# Patient Record
Sex: FEMALE | Race: WHITE | HISPANIC OR LATINO
[De-identification: names, ages, dates, MRNs, and addresses within clinical notes are randomized per-mention and may not be internally consistent; named-entity substitution may affect disease eponyms.]

---

## 2018-08-30 ENCOUNTER — RESULT REVIEW (OUTPATIENT)
Age: 33
End: 2018-08-30

## 2020-08-19 ENCOUNTER — TRANSCRIPTION ENCOUNTER (OUTPATIENT)
Age: 35
End: 2020-08-19

## 2020-11-18 ENCOUNTER — RESULT REVIEW (OUTPATIENT)
Age: 35
End: 2020-11-18

## 2021-01-27 ENCOUNTER — RESULT REVIEW (OUTPATIENT)
Age: 36
End: 2021-01-27

## 2021-04-21 ENCOUNTER — RESULT REVIEW (OUTPATIENT)
Age: 36
End: 2021-04-21

## 2021-12-01 ENCOUNTER — TRANSCRIPTION ENCOUNTER (OUTPATIENT)
Age: 36
End: 2021-12-01

## 2022-04-05 ENCOUNTER — APPOINTMENT (OUTPATIENT)
Dept: OTOLARYNGOLOGY | Facility: CLINIC | Age: 37
End: 2022-04-05
Payer: COMMERCIAL

## 2022-04-05 VITALS
BODY MASS INDEX: 23 KG/M2 | DIASTOLIC BLOOD PRESSURE: 68 MMHG | SYSTOLIC BLOOD PRESSURE: 113 MMHG | WEIGHT: 125 LBS | HEIGHT: 62 IN | HEART RATE: 69 BPM

## 2022-04-05 PROCEDURE — 99243 OFF/OP CNSLTJ NEW/EST LOW 30: CPT

## 2022-04-05 NOTE — PHYSICAL EXAM
[FreeTextEntry1] : there is a palp smooth mobile sl tender cystic lesion palp R inf cheek externally and submucosal intraorally.  [Normal] : no rashes

## 2022-04-05 NOTE — CONSULT LETTER
[Dear  ___] : Dear  [unfilled], [Consult Letter:] : I had the pleasure of evaluating your patient, [unfilled]. [Please see my note below.] : Please see my note below. [Sincerely,] : Sincerely, [FreeTextEntry2] : Abhay Elam MD ( [place], [place]) [FreeTextEntry3] : Yobany Grande MD, FACS\par \par    [facility] [place]\par Associate Chair\par    Department of Otolaryngology\par \par Professor\par Otolaryngology & Molecular Medicine\par [facility] [employer]\par

## 2022-04-05 NOTE — HISTORY OF PRESENT ILLNESS
[None] : No associated symptoms are reported. [de-identified] : 38 yo female who presents for an evaluation on right cheek lump. SHe is being referred by Dr. ОЛЬГА Elam. She states noticing a lump on her right cheek in Jan. 2022 she was treated with antibiotics by her PCP, no change, she then saw dermatology and then ENT. She feels as the lump has gotten larger, uncomfortable. Denies any trauma to the area.\par 2/4/2022 ultrasound soft tissue neck showing palpable lump in the right mandibular area measuring 1.1cm\par 3/7/2022 FNA right mandible nodule negative for malignant cells.\par 2/17/2021 ultrasound showing few bilateral thyroid nodules. right upper pole cystic nodule is slighly larger, the left lower lobe complex nodule is slightly smaller and a 5 mm right midpole cyst nodule is newly noted.  Denies family hisotry of head and neck cancer. \par Denies history of smoking. Occasional alcohol intake. \par Booster Covid 19 vacc 11/2021\par \par Pt feels lesion intraorally w her tongue

## 2022-04-08 ENCOUNTER — TRANSCRIPTION ENCOUNTER (OUTPATIENT)
Age: 37
End: 2022-04-08

## 2022-04-20 ENCOUNTER — APPOINTMENT (OUTPATIENT)
Dept: MRI IMAGING | Facility: CLINIC | Age: 37
End: 2022-04-20

## 2022-04-20 ENCOUNTER — OUTPATIENT (OUTPATIENT)
Dept: OUTPATIENT SERVICES | Facility: HOSPITAL | Age: 37
LOS: 1 days | End: 2022-04-20
Payer: COMMERCIAL

## 2022-04-20 DIAGNOSIS — K09.9 CYST OF ORAL REGION, UNSPECIFIED: ICD-10-CM

## 2022-04-20 DIAGNOSIS — N60.01 SOLITARY CYST OF RIGHT BREAST: Chronic | ICD-10-CM

## 2022-04-20 DIAGNOSIS — Z98.89 OTHER SPECIFIED POSTPROCEDURAL STATES: Chronic | ICD-10-CM

## 2022-04-20 PROCEDURE — A9585: CPT

## 2022-04-20 PROCEDURE — 70543 MRI ORBT/FAC/NCK W/O &W/DYE: CPT

## 2022-04-20 PROCEDURE — 70543 MRI ORBT/FAC/NCK W/O &W/DYE: CPT | Mod: 26

## 2022-05-13 DIAGNOSIS — Z01.818 ENCOUNTER FOR OTHER PREPROCEDURAL EXAMINATION: ICD-10-CM

## 2022-05-20 ENCOUNTER — OUTPATIENT (OUTPATIENT)
Dept: OUTPATIENT SERVICES | Facility: HOSPITAL | Age: 37
LOS: 1 days | End: 2022-05-20

## 2022-05-20 VITALS
DIASTOLIC BLOOD PRESSURE: 74 MMHG | SYSTOLIC BLOOD PRESSURE: 114 MMHG | HEART RATE: 57 BPM | RESPIRATION RATE: 16 BRPM | OXYGEN SATURATION: 98 % | TEMPERATURE: 97 F | HEIGHT: 61.5 IN | WEIGHT: 125 LBS

## 2022-05-20 DIAGNOSIS — K09.8 OTHER CYSTS OF ORAL REGION, NOT ELSEWHERE CLASSIFIED: ICD-10-CM

## 2022-05-20 DIAGNOSIS — K09.9 CYST OF ORAL REGION, UNSPECIFIED: ICD-10-CM

## 2022-05-20 DIAGNOSIS — N60.01 SOLITARY CYST OF RIGHT BREAST: Chronic | ICD-10-CM

## 2022-05-20 DIAGNOSIS — Z98.890 OTHER SPECIFIED POSTPROCEDURAL STATES: Chronic | ICD-10-CM

## 2022-05-20 DIAGNOSIS — Z98.89 OTHER SPECIFIED POSTPROCEDURAL STATES: Chronic | ICD-10-CM

## 2022-05-20 LAB
HCG UR QL: NEGATIVE — SIGNIFICANT CHANGE UP
HCT VFR BLD CALC: 40.8 % — SIGNIFICANT CHANGE UP (ref 34.5–45)
HGB BLD-MCNC: 13.1 G/DL — SIGNIFICANT CHANGE UP (ref 11.5–15.5)
MCHC RBC-ENTMCNC: 29.4 PG — SIGNIFICANT CHANGE UP (ref 27–34)
MCHC RBC-ENTMCNC: 32.1 GM/DL — SIGNIFICANT CHANGE UP (ref 32–36)
MCV RBC AUTO: 91.7 FL — SIGNIFICANT CHANGE UP (ref 80–100)
NRBC # BLD: 0 /100 WBCS — SIGNIFICANT CHANGE UP
NRBC # FLD: 0 K/UL — SIGNIFICANT CHANGE UP
PLATELET # BLD AUTO: 309 K/UL — SIGNIFICANT CHANGE UP (ref 150–400)
RBC # BLD: 4.45 M/UL — SIGNIFICANT CHANGE UP (ref 3.8–5.2)
RBC # FLD: 14.6 % — HIGH (ref 10.3–14.5)
WBC # BLD: 7.35 K/UL — SIGNIFICANT CHANGE UP (ref 3.8–10.5)
WBC # FLD AUTO: 7.35 K/UL — SIGNIFICANT CHANGE UP (ref 3.8–10.5)

## 2022-05-20 RX ORDER — SODIUM CHLORIDE 9 MG/ML
1000 INJECTION, SOLUTION INTRAVENOUS
Refills: 0 | Status: DISCONTINUED | OUTPATIENT
Start: 2022-06-03 | End: 2022-06-18

## 2022-05-20 NOTE — H&P PST ADULT - ENMT COMMENTS
hx of cyst to right cheek which she first noticed jan 2022.  Pt reports cyst was drained, then returns. Scheduled for right excision of oral lesion right cheek cyst per dx

## 2022-05-20 NOTE — H&P PST ADULT - ASSESSMENT
36 y/o female with hx of cyst to right cheek which she first noticed jan 2022.  Pt reports cyst was drained, then returns. Scheduled for right excision of oral lesion

## 2022-05-20 NOTE — H&P PST ADULT - NSICDXPASTSURGICALHX_GEN_ALL_CORE_FT
PAST SURGICAL HISTORY:  Breast cyst, right cyst removal - benign    S/P bunionectomy 2010 (R) 5th toe screw    S/P LASIK surgery 2006

## 2022-05-20 NOTE — H&P PST ADULT - NSICDXPASTMEDICALHX_GEN_ALL_CORE_FT
PAST MEDICAL HISTORY:  Endometriosis     Fibrocystic breast      PAST MEDICAL HISTORY:  Cyst of mouth     Endometriosis     Fibrocystic breast

## 2022-05-20 NOTE — H&P PST ADULT - HISTORY OF PRESENT ILLNESS
38 y/o female with hx of cyst to right cheek which she first noticed jan 2022.  Pt reports cyst was drained, then returns. Scheduled for right excision of oral lesion

## 2022-06-01 LAB — SARS-COV-2 N GENE NPH QL NAA+PROBE: NOT DETECTED

## 2022-06-02 ENCOUNTER — TRANSCRIPTION ENCOUNTER (OUTPATIENT)
Age: 37
End: 2022-06-02

## 2022-06-02 NOTE — ASU PATIENT PROFILE, ADULT - FALL HARM RISK - UNIVERSAL INTERVENTIONS
Bed in lowest position, wheels locked, appropriate side rails in place/Call bell, personal items and telephone in reach/Instruct patient to call for assistance before getting out of bed or chair/Non-slip footwear when patient is out of bed/[place] to call system/Physically safe environment - no spills, clutter or unnecessary equipment/Purposeful Proactive Rounding/Room/bathroom lighting operational, light cord in reach

## 2022-06-02 NOTE — ASU PATIENT PROFILE, ADULT - IS PATIENT PREGNANT?
no
No. DINESH screening performed.  STOP BANG Legend: 0-2 = LOW Risk; 3-4 = INTERMEDIATE Risk; 5-8 = HIGH Risk

## 2022-06-03 ENCOUNTER — OUTPATIENT (OUTPATIENT)
Dept: OUTPATIENT SERVICES | Facility: HOSPITAL | Age: 37
LOS: 1 days | Discharge: HOME | End: 2022-06-03
Payer: COMMERCIAL

## 2022-06-03 ENCOUNTER — TRANSCRIPTION ENCOUNTER (OUTPATIENT)
Age: 37
End: 2022-06-03

## 2022-06-03 ENCOUNTER — APPOINTMENT (OUTPATIENT)
Dept: OTOLARYNGOLOGY | Facility: HOSPITAL | Age: 37
End: 2022-06-03

## 2022-06-03 VITALS
RESPIRATION RATE: 16 BRPM | HEART RATE: 66 BPM | DIASTOLIC BLOOD PRESSURE: 59 MMHG | OXYGEN SATURATION: 100 % | SYSTOLIC BLOOD PRESSURE: 107 MMHG

## 2022-06-03 VITALS
OXYGEN SATURATION: 100 % | TEMPERATURE: 99 F | RESPIRATION RATE: 16 BRPM | DIASTOLIC BLOOD PRESSURE: 78 MMHG | HEART RATE: 65 BPM | WEIGHT: 125 LBS | HEIGHT: 61.5 IN | SYSTOLIC BLOOD PRESSURE: 107 MMHG

## 2022-06-03 DIAGNOSIS — Z98.890 OTHER SPECIFIED POSTPROCEDURAL STATES: Chronic | ICD-10-CM

## 2022-06-03 DIAGNOSIS — Z98.89 OTHER SPECIFIED POSTPROCEDURAL STATES: Chronic | ICD-10-CM

## 2022-06-03 DIAGNOSIS — K09.9 CYST OF ORAL REGION, UNSPECIFIED: ICD-10-CM

## 2022-06-03 DIAGNOSIS — N60.01 SOLITARY CYST OF RIGHT BREAST: Chronic | ICD-10-CM

## 2022-06-03 LAB — HCG UR QL: NEGATIVE — SIGNIFICANT CHANGE UP

## 2022-06-03 PROCEDURE — 92502 EAR AND THROAT EXAMINATION: CPT | Mod: GC

## 2022-06-03 NOTE — ASU DISCHARGE PLAN (ADULT/PEDIATRIC) - CARE PROVIDER_API CALL
Yobany Grande)  [facility]; Otolaryngology  [address] [place] [id number]  Phone: [phone]  Fax: [phone]  Follow Up Time:

## 2022-06-21 PROBLEM — N80.9 ENDOMETRIOSIS, UNSPECIFIED: Chronic | Status: ACTIVE | Noted: 2022-05-20

## 2022-07-19 ENCOUNTER — APPOINTMENT (OUTPATIENT)
Dept: OTOLARYNGOLOGY | Facility: CLINIC | Age: 37
End: 2022-07-19

## 2022-08-02 ENCOUNTER — APPOINTMENT (OUTPATIENT)
Dept: OTOLARYNGOLOGY | Facility: CLINIC | Age: 37
End: 2022-08-02

## 2022-08-02 VITALS
SYSTOLIC BLOOD PRESSURE: 118 MMHG | HEIGHT: 62 IN | DIASTOLIC BLOOD PRESSURE: 72 MMHG | HEART RATE: 82 BPM | BODY MASS INDEX: 21.71 KG/M2 | WEIGHT: 118 LBS

## 2022-08-02 DIAGNOSIS — K09.9 CYST OF ORAL REGION, UNSPECIFIED: ICD-10-CM

## 2022-08-02 PROCEDURE — 99213 OFFICE O/P EST LOW 20 MIN: CPT

## 2022-08-02 NOTE — CONSULT LETTER
[Dear  ___] : Dear  [unfilled], [Courtesy Letter:] : I had the pleasure of seeing your patient, [unfilled], in my office today. [Please see my note below.] : Please see my note below. [Sincerely,] : Sincerely, [FreeTextEntry2] : Abhay Elam MD ( [place], [place])  [FreeTextEntry3] : Yobany Grande MD, FACS\par \par [facility] [place]\par Associate Chair\par Department of Otolaryngology\par Professor\par Otolaryngology & Molecular Medicine\par [facility] [employer]\par

## 2022-08-02 NOTE — REASON FOR VISIT
[Post-Operative Visit] : a post-operative visit [FreeTextEntry2] : Otolaryngology evaluation under anesthesia on 6/3/2022

## 2022-08-02 NOTE — HISTORY OF PRESENT ILLNESS
[de-identified] : Ms. Oreilly presetns for post op follow up. She is s/p otolaryngology evaluation under anesthesia on 6/3/2022 for right buccal region cyst.\par States still having discomfort in the area of the cyst.  States feels cyst has gotten larger than prior to the attempted surgery.  Denies fluid coming from cyst.  \par Denies fevers, weight loss.\par

## 2022-08-05 ENCOUNTER — NON-APPOINTMENT (OUTPATIENT)
Age: 37
End: 2022-08-05

## 2022-08-06 ENCOUNTER — NON-APPOINTMENT (OUTPATIENT)
Age: 37
End: 2022-08-06

## 2022-08-26 ENCOUNTER — OUTPATIENT (OUTPATIENT)
Dept: OUTPATIENT SERVICES | Facility: HOSPITAL | Age: 37
LOS: 1 days | End: 2022-08-26

## 2022-08-26 VITALS
RESPIRATION RATE: 16 BRPM | SYSTOLIC BLOOD PRESSURE: 122 MMHG | TEMPERATURE: 97 F | HEART RATE: 73 BPM | OXYGEN SATURATION: 98 % | WEIGHT: 113.98 LBS | HEIGHT: 61 IN | DIASTOLIC BLOOD PRESSURE: 78 MMHG

## 2022-08-26 DIAGNOSIS — D49.2 NEOPLASM OF UNSPECIFIED BEHAVIOR OF BONE, SOFT TISSUE, AND SKIN: ICD-10-CM

## 2022-08-26 DIAGNOSIS — R22.0 LOCALIZED SWELLING, MASS AND LUMP, HEAD: ICD-10-CM

## 2022-08-26 DIAGNOSIS — Z98.89 OTHER SPECIFIED POSTPROCEDURAL STATES: Chronic | ICD-10-CM

## 2022-08-26 DIAGNOSIS — N60.01 SOLITARY CYST OF RIGHT BREAST: Chronic | ICD-10-CM

## 2022-08-26 DIAGNOSIS — Z87.42 PERSONAL HISTORY OF OTHER DISEASES OF THE FEMALE GENITAL TRACT: Chronic | ICD-10-CM

## 2022-08-26 DIAGNOSIS — Z98.890 OTHER SPECIFIED POSTPROCEDURAL STATES: Chronic | ICD-10-CM

## 2022-08-26 LAB
HCG UR QL: NEGATIVE — SIGNIFICANT CHANGE UP
HCT VFR BLD CALC: 39.3 % — SIGNIFICANT CHANGE UP (ref 34.5–45)
HGB BLD-MCNC: 12.7 G/DL — SIGNIFICANT CHANGE UP (ref 11.5–15.5)
MCHC RBC-ENTMCNC: 28.7 PG — SIGNIFICANT CHANGE UP (ref 27–34)
MCHC RBC-ENTMCNC: 32.3 GM/DL — SIGNIFICANT CHANGE UP (ref 32–36)
MCV RBC AUTO: 88.9 FL — SIGNIFICANT CHANGE UP (ref 80–100)
NRBC # BLD: 0 /100 WBCS — SIGNIFICANT CHANGE UP (ref 0–0)
NRBC # FLD: 0 K/UL — SIGNIFICANT CHANGE UP (ref 0–0)
PLATELET # BLD AUTO: 315 K/UL — SIGNIFICANT CHANGE UP (ref 150–400)
RBC # BLD: 4.42 M/UL — SIGNIFICANT CHANGE UP (ref 3.8–5.2)
RBC # FLD: 12.9 % — SIGNIFICANT CHANGE UP (ref 10.3–14.5)
WBC # BLD: 10.21 K/UL — SIGNIFICANT CHANGE UP (ref 3.8–10.5)
WBC # FLD AUTO: 10.21 K/UL — SIGNIFICANT CHANGE UP (ref 3.8–10.5)

## 2022-08-26 RX ORDER — SODIUM CHLORIDE 9 MG/ML
1000 INJECTION, SOLUTION INTRAVENOUS
Refills: 0 | Status: DISCONTINUED | OUTPATIENT
Start: 2022-09-08 | End: 2022-09-22

## 2022-08-26 RX ORDER — L.ACIDOPH/B.ANIMALIS/B.LONGUM 15B CELL
1 CAPSULE ORAL
Qty: 0 | Refills: 0 | DISCHARGE

## 2022-08-26 RX ORDER — MULTIVIT-MIN/FERROUS GLUCONATE 9 MG/15 ML
0 LIQUID (ML) ORAL
Qty: 0 | Refills: 0 | DISCHARGE

## 2022-08-26 NOTE — H&P PST ADULT - HISTORY OF PRESENT ILLNESS
38 y/o female with hx of cyst to right cheek which she first noticed Jan 2022.  Pt reports cyst was drained, then returns. Scheduled for Excision benign tumor or cyst right mandible.  38 y/o female with hx of cyst to right cheek which she first noticed Jan 2022.  Pt reports cyst was drained, then returns.  Pt went to OR [facility] May 2022 for excisional biopsy which was aborted after spontaneous decompression during the procedure. The cyst reoccurred after surgery.  Now scheduled for  Excision benign tumor or cyst right mandible.     Per surgeon's note dated 8/7/22  "submucosal mass right vestibule not attached to mucosa, nor mandible"

## 2022-08-26 NOTE — H&P PST ADULT - ASSESSMENT
36 y/o female with hx of cyst to right cheek which she first noticed Jan 2022.  Pt reports cyst was drained, then returns.  Pt went to OR [facility] May 2022 for excisional biopsy which was aborted after spontaneous decompression during the procedure. The cyst reoccurred after surgery.  Now scheduled for  Excision benign tumor or cyst right mandible.     Per surgeon's note dated 8/7/22  "submucosal mass right vestibule not attached to mucosa, nor mandible"

## 2022-08-26 NOTE — H&P PST ADULT - PROBLEM SELECTOR PLAN 1
Excision benign tumor or cyst right mandible.     Per surgeon's note dated 8/7/22  "submucosal mass right vestibule not attached to mucosa, nor mandible"    CBC UCG    preop instructions given and explained

## 2022-08-26 NOTE — H&P PST ADULT - NSICDXPASTSURGICALHX_GEN_ALL_CORE_FT
PAST SURGICAL HISTORY:  Breast cyst, right cyst removal - benign    History of endometriosis laparosocpy 2017    S/P bunionectomy 2010 (R) 5th toe screw    S/P LASIK surgery 2006

## 2022-08-26 NOTE — H&P PST ADULT - NSICDXPASTMEDICALHX_GEN_ALL_CORE_FT
PAST MEDICAL HISTORY:  Cyst of mouth     Endometriosis     Fibrocystic breast      PAST MEDICAL HISTORY:  Cyst of mouth submucosal mass right vestible, not attached to mucosa, nor the mandible, per surgeon's note dated 8/7/22.    Endometriosis     Fibrocystic breast

## 2022-08-26 NOTE — H&P PST ADULT - ENMT COMMENTS
hx of cyst to right cheek which she first noticed Jan 2022.  Pt reports cyst was drained, then returns. Scheduled for Excision benign tumor or cyst right mandible. right mandible mass palpated. hx of cyst to right cheek which she first noticed Jan 2022.  Pt reports cyst was drained, then returns.  Pt went to OR [facility] May 2022 for excisional biopsy which was aborted after spontaneous decompression during the procedure. The cyst reoccurred after surgery.  Now scheduled for  Excision benign tumor or cyst right mandible.  per surgeon's note dated 8/7/22 " submucosal mass right vestibule, not attached to mucosa, nor the mandible". right cheek mass palpated.

## 2022-08-26 NOTE — H&P PST ADULT - ENDOCRINE COMMENTS
hx hypothyroidism in pregnancy, was on synthroid at that time. Resolved after delivery, no longer on medication

## 2022-09-05 LAB — SARS-COV-2 RNA SPEC QL NAA+PROBE: SIGNIFICANT CHANGE UP

## 2022-09-07 ENCOUNTER — TRANSCRIPTION ENCOUNTER (OUTPATIENT)
Age: 37
End: 2022-09-07

## 2022-09-07 NOTE — ASU PATIENT PROFILE, ADULT - NSICDXPASTMEDICALHX_GEN_ALL_CORE_FT
PAST MEDICAL HISTORY:  Cyst of mouth submucosal mass right vestible, not attached to mucosa, nor the mandible, per surgeon's note dated 8/7/22.    Endometriosis     Fibrocystic breast

## 2022-09-07 NOTE — ASU PATIENT PROFILE, ADULT - FALL HARM RISK - UNIVERSAL INTERVENTIONS
Bed in lowest position, wheels locked, appropriate side rails in place/Call bell, personal items and telephone in reach/Instruct patient to call for assistance before getting out of bed or chair/Non-slip footwear when patient is out of bed/[place] to call system/Physically safe environment - no spills, clutter or unnecessary equipment/Purposeful Proactive Rounding/Room/bathroom lighting operational, light cord in reach

## 2022-09-08 ENCOUNTER — TRANSCRIPTION ENCOUNTER (OUTPATIENT)
Age: 37
End: 2022-09-08

## 2022-09-08 ENCOUNTER — OUTPATIENT (OUTPATIENT)
Dept: OUTPATIENT SERVICES | Facility: HOSPITAL | Age: 37
LOS: 1 days | Discharge: HOME | End: 2022-09-08

## 2022-09-08 ENCOUNTER — RESULT REVIEW (OUTPATIENT)
Age: 37
End: 2022-09-08

## 2022-09-08 VITALS
OXYGEN SATURATION: 100 % | HEIGHT: 61 IN | RESPIRATION RATE: 16 BRPM | SYSTOLIC BLOOD PRESSURE: 102 MMHG | TEMPERATURE: 98 F | HEART RATE: 66 BPM | WEIGHT: 117.95 LBS | DIASTOLIC BLOOD PRESSURE: 62 MMHG

## 2022-09-08 VITALS
RESPIRATION RATE: 16 BRPM | OXYGEN SATURATION: 99 % | DIASTOLIC BLOOD PRESSURE: 72 MMHG | HEART RATE: 86 BPM | SYSTOLIC BLOOD PRESSURE: 117 MMHG

## 2022-09-08 DIAGNOSIS — N60.01 SOLITARY CYST OF RIGHT BREAST: Chronic | ICD-10-CM

## 2022-09-08 DIAGNOSIS — Z98.89 OTHER SPECIFIED POSTPROCEDURAL STATES: Chronic | ICD-10-CM

## 2022-09-08 DIAGNOSIS — Z87.42 PERSONAL HISTORY OF OTHER DISEASES OF THE FEMALE GENITAL TRACT: Chronic | ICD-10-CM

## 2022-09-08 DIAGNOSIS — D49.2 NEOPLASM OF UNSPECIFIED BEHAVIOR OF BONE, SOFT TISSUE, AND SKIN: ICD-10-CM

## 2022-09-08 DIAGNOSIS — Z98.890 OTHER SPECIFIED POSTPROCEDURAL STATES: Chronic | ICD-10-CM

## 2022-09-08 LAB — HCG UR QL: NEGATIVE — SIGNIFICANT CHANGE UP

## 2022-09-08 PROCEDURE — 88305 TISSUE EXAM BY PATHOLOGIST: CPT | Mod: 26

## 2022-09-08 PROCEDURE — 88342 IMHCHEM/IMCYTCHM 1ST ANTB: CPT | Mod: 26

## 2022-09-08 RX ORDER — L.ACIDOPH/B.ANIMALIS/B.LONGUM 15B CELL
1 CAPSULE ORAL
Qty: 0 | Refills: 0 | DISCHARGE

## 2022-09-08 RX ORDER — OXYCODONE HYDROCHLORIDE 5 MG/1
5 TABLET ORAL EVERY 4 HOURS
Refills: 0 | Status: DISCONTINUED | OUTPATIENT
Start: 2022-09-08 | End: 2022-09-08

## 2022-09-08 RX ORDER — ONDANSETRON 8 MG/1
4 TABLET, FILM COATED ORAL ONCE
Refills: 0 | Status: DISCONTINUED | OUTPATIENT
Start: 2022-09-08 | End: 2022-09-22

## 2022-09-08 RX ORDER — ONDANSETRON 8 MG/1
4 TABLET, FILM COATED ORAL EVERY 8 HOURS
Refills: 0 | Status: DISCONTINUED | OUTPATIENT
Start: 2022-09-08 | End: 2022-09-22

## 2022-09-08 RX ORDER — OXYCODONE HYDROCHLORIDE 5 MG/1
10 TABLET ORAL EVERY 4 HOURS
Refills: 0 | Status: DISCONTINUED | OUTPATIENT
Start: 2022-09-08 | End: 2022-09-08

## 2022-09-08 RX ORDER — FENTANYL CITRATE 50 UG/ML
25 INJECTION INTRAVENOUS
Refills: 0 | Status: DISCONTINUED | OUTPATIENT
Start: 2022-09-08 | End: 2022-09-08

## 2022-09-08 RX ORDER — ACETAMINOPHEN 500 MG
650 TABLET ORAL EVERY 6 HOURS
Refills: 0 | Status: DISCONTINUED | OUTPATIENT
Start: 2022-09-08 | End: 2022-09-22

## 2022-09-08 RX ORDER — HYDROMORPHONE HYDROCHLORIDE 2 MG/ML
0.5 INJECTION INTRAMUSCULAR; INTRAVENOUS; SUBCUTANEOUS
Refills: 0 | Status: DISCONTINUED | OUTPATIENT
Start: 2022-09-08 | End: 2022-09-08

## 2022-09-08 RX ORDER — AMPICILLIN SODIUM AND SULBACTAM SODIUM 250; 125 MG/ML; MG/ML
3 INJECTION, POWDER, FOR SUSPENSION INTRAMUSCULAR; INTRAVENOUS EVERY 6 HOURS
Refills: 0 | Status: DISCONTINUED | OUTPATIENT
Start: 2022-09-08 | End: 2022-09-22

## 2022-09-08 RX ORDER — SODIUM CHLORIDE 9 MG/ML
1000 INJECTION, SOLUTION INTRAVENOUS
Refills: 0 | Status: DISCONTINUED | OUTPATIENT
Start: 2022-09-08 | End: 2022-09-22

## 2022-09-08 RX ORDER — OXYCODONE HYDROCHLORIDE 5 MG/1
1 TABLET ORAL
Qty: 5 | Refills: 0
Start: 2022-09-08

## 2022-09-08 RX ORDER — IBUPROFEN 200 MG
600 TABLET ORAL EVERY 6 HOURS
Refills: 0 | Status: DISCONTINUED | OUTPATIENT
Start: 2022-09-08 | End: 2022-09-22

## 2022-09-08 RX ADMIN — SODIUM CHLORIDE 30 MILLILITER(S): 9 INJECTION, SOLUTION INTRAVENOUS at 07:16

## 2022-09-08 NOTE — H&P ADULT - NSHPPHYSICALEXAM_GEN_ALL_CORE
General: AAOx3  Head: NC/AT  Eyes: EOMI, PERRL   Ears: No hearing impairment  Nose: Symmetric, no epistaxis, no septal hematoma   Throat: Trachea/uvula midline

## 2022-09-08 NOTE — ASU DISCHARGE PLAN (ADULT/PEDIATRIC) - PROVIDER TOKENS
PROVIDER:[TOKEN:[[id number]],SCHEDULEDAPPT:[09/19/2022],SCHEDULEDAPPTTIME:[01:00 PM],ESTABLISHEDPATIENT:[T]]

## 2022-09-08 NOTE — ASU DISCHARGE PLAN (ADULT/PEDIATRIC) - CARE PROVIDER_API CALL
Frank Abreu (DDS; MD)  OralMaxillofacial Surgery  [address] [place] [address]  Phone: [phone]  Fax: [phone]  Established Patient  Scheduled Appointment: 09/19/2022 01:00 PM

## 2022-09-08 NOTE — H&P ADULT - ASSESSMENT
38 y/o female with history of endometriosis and fibrocystic breast presents with submucosal mass in the right mandibular vestibule.       Plan:   Scheduled today for excision of right buccal cyst in the operating room       Harinder Mcgovern DDS   Oral and Maxillofacial Surgery   Pager #[phone]  Available on Microsoft Teams

## 2022-09-08 NOTE — ASU DISCHARGE PLAN (ADULT/PEDIATRIC) - NS MD DC FALL RISK RISK
For information on Fall & Injury Prevention, visit: [email or web address] OR  [email or web address] OR  https://www.cdc.gov/steadi/patient.html

## 2022-09-08 NOTE — ASU DISCHARGE PLAN (ADULT/PEDIATRIC) - NURSING INSTRUCTIONS
Watch for signs of infection; redness, swelling, fever, chills or heat, report such symptoms to the MD. No driving while taking pain medication, it causes drowsiness & constipation. Drink 6-8 glasses of fluids daily to promote hydration. No heavy lifting, pulling or pushing heavy objects. Follow up with surgical MD.

## 2022-09-08 NOTE — ASU DISCHARGE PLAN (ADULT/PEDIATRIC) - ASU DC SPECIAL INSTRUCTIONSFT
please follow up with [facility] on 09/19/2022 with Dr. Abreu. You will take over the counter tylenol and ibuprofen for pain management. We sent stronger pain medication that can be taken for breakthrough pain and Augmentin antibiotic to be taken twice a day for 7 days.     [facility]  [phone] [place] [address]  1st floor    [phone]

## 2022-09-09 LAB — SURGICAL PATHOLOGY STUDY: SIGNIFICANT CHANGE UP

## 2023-11-24 NOTE — H&P ADULT - HISTORY OF PRESENT ILLNESS
38 y/o female with history of submucosal mass in the right mandibular vestibule which she first noticed Jan 2022.  Pt reports cyst was drained, then returns.  Pt went to OR [facility] May 2022 for excisional biopsy which was aborted after spontaneous decompression during the procedure. The cyst reoccurred after surgery.  Now scheduled for excision benign tumor or cyst on right mandible.   
stage I

## 2025-02-16 ENCOUNTER — NON-APPOINTMENT (OUTPATIENT)
Age: 40
End: 2025-02-16

## (undated) DEVICE — BUR LINVATEC OVAL MEDIUM 4MM CARBIDE

## (undated) DEVICE — DRSG TELFA 3 X 8

## (undated) DEVICE — STAPLER SKIN VISI-STAT 35 WIDE

## (undated) DEVICE — NDL HYPO SAFE 22G X 1" (BLACK)

## (undated) DEVICE — DRAPE SPLIT SHEET 77" X 120"

## (undated) DEVICE — DRAPE INSTRUMENT POUCH 6.75" X 11"

## (undated) DEVICE — WARMING BLANKET LOWER ADULT

## (undated) DEVICE — POSITIONER FOAM EGG CRATE ULNAR 2PCS (PINK)

## (undated) DEVICE — DRAPE 3/4 SHEET 52X76"

## (undated) DEVICE — PROTECTOR HEEL / ELBOW FLUFFY

## (undated) DEVICE — DRAPE TOWEL BLUE 17" X 24"

## (undated) DEVICE — GLV 7 PROTEXIS (WHITE)

## (undated) DEVICE — DRAIN PENROSE .25" X 18" LATEX

## (undated) DEVICE — SYR LUER LOK 10CC

## (undated) DEVICE — DRAPE MAGNETIC INSTRUMENT MEDIUM

## (undated) DEVICE — CANISTER DISPOSABLE THIN WALL 3000CC

## (undated) DEVICE — SUT VICRYL 4-0 27" RB-1 UNDYED

## (undated) DEVICE — SUT SILK 3-0 18" TIES

## (undated) DEVICE — LIJ-STRYKER REMB ORAL DRILLS: Type: DURABLE MEDICAL EQUIPMENT

## (undated) DEVICE — PREP BETADINE KIT

## (undated) DEVICE — SAW BLADE STRYKER RECIPROCATING 22.5MMX0.38MM

## (undated) DEVICE — BRA JAW

## (undated) DEVICE — SYR LUER LOK 20CC

## (undated) DEVICE — PACK DENTAL

## (undated) DEVICE — VENODYNE/SCD SLEEVE CALF MEDIUM

## (undated) DEVICE — SOL IRR POUR H2O 500ML

## (undated) DEVICE — SUT CHROMIC 3-0 27" RB-1

## (undated) DEVICE — ELCTR BOVIE PENCIL SMOKE EVACUATION

## (undated) DEVICE — SUT SILK 2-0 18" TIES

## (undated) DEVICE — SYR LUER LOK 3CC

## (undated) DEVICE — LABELS BLANK W PEN

## (undated) DEVICE — ELCTR BOVIE TIP BLADE INSULATED 2.8" EDGE WITH SAFETY

## (undated) DEVICE — SYR LUER LOK 5CC

## (undated) DEVICE — PACK HEAD & NECK

## (undated) DEVICE — BUR LINVATEC CARBIDE SIDECUTTING 0.8MM 4.9MM

## (undated) DEVICE — SOL IRR POUR NS 0.9% 500ML

## (undated) DEVICE — SUT VICRYL 3-0 27" SH UNDYED

## (undated) DEVICE — VAGINAL PACKING 2"